# Patient Record
Sex: FEMALE | Race: WHITE | Employment: FULL TIME | URBAN - NONMETROPOLITAN AREA
[De-identification: names, ages, dates, MRNs, and addresses within clinical notes are randomized per-mention and may not be internally consistent; named-entity substitution may affect disease eponyms.]

---

## 2017-01-13 ENCOUNTER — TELEPHONE (OUTPATIENT)
Dept: CARDIOLOGY | Age: 47
End: 2017-01-13

## 2017-02-03 ENCOUNTER — OFFICE VISIT (OUTPATIENT)
Dept: CARDIOLOGY | Age: 47
End: 2017-02-03

## 2017-02-03 VITALS
HEART RATE: 92 BPM | BODY MASS INDEX: 27 KG/M2 | SYSTOLIC BLOOD PRESSURE: 150 MMHG | DIASTOLIC BLOOD PRESSURE: 88 MMHG | HEIGHT: 66 IN | WEIGHT: 168 LBS

## 2017-02-03 DIAGNOSIS — I34.0 NON-RHEUMATIC MITRAL REGURGITATION: ICD-10-CM

## 2017-02-03 DIAGNOSIS — I10 ESSENTIAL HYPERTENSION: ICD-10-CM

## 2017-02-03 DIAGNOSIS — R07.82 INTERCOSTAL PAIN: Primary | ICD-10-CM

## 2017-02-03 PROCEDURE — 99214 OFFICE O/P EST MOD 30 MIN: CPT | Performed by: NUCLEAR MEDICINE

## 2017-02-03 PROCEDURE — 93000 ELECTROCARDIOGRAM COMPLETE: CPT | Performed by: NUCLEAR MEDICINE

## 2017-02-03 RX ORDER — METOPROLOL TARTRATE 50 MG/1
50 TABLET, FILM COATED ORAL 2 TIMES DAILY
Qty: 60 TABLET | Refills: 3 | Status: SHIPPED | OUTPATIENT
Start: 2017-02-03 | End: 2017-05-05 | Stop reason: SDUPTHER

## 2017-02-08 ENCOUNTER — TELEPHONE (OUTPATIENT)
Dept: CARDIOLOGY | Age: 47
End: 2017-02-08

## 2017-03-07 ENCOUNTER — OFFICE VISIT (OUTPATIENT)
Age: 47
End: 2017-03-07

## 2017-03-07 ENCOUNTER — TELEPHONE (OUTPATIENT)
Age: 47
End: 2017-03-07

## 2017-03-07 VITALS
SYSTOLIC BLOOD PRESSURE: 128 MMHG | RESPIRATION RATE: 16 BRPM | DIASTOLIC BLOOD PRESSURE: 78 MMHG | HEIGHT: 66 IN | BODY MASS INDEX: 25.71 KG/M2 | WEIGHT: 160 LBS | HEART RATE: 80 BPM | TEMPERATURE: 96.9 F

## 2017-03-07 DIAGNOSIS — K81.1 CHRONIC CHOLECYSTITIS: Primary | ICD-10-CM

## 2017-03-07 DIAGNOSIS — K82.8 BILIARY DYSKINESIA: ICD-10-CM

## 2017-03-07 PROCEDURE — 99244 OFF/OP CNSLTJ NEW/EST MOD 40: CPT | Performed by: SURGERY

## 2017-03-07 RX ORDER — NICOTINE POLACRILEX 4 MG/1
1 GUM, CHEWING ORAL DAILY
COMMUNITY
Start: 2017-02-09 | End: 2017-05-05 | Stop reason: ALTCHOICE

## 2017-03-07 ASSESSMENT — ENCOUNTER SYMPTOMS
VOMITING: 0
COUGH: 0
NAUSEA: 1
EYE ITCHING: 0
PHOTOPHOBIA: 0
EYE PAIN: 0
RHINORRHEA: 0
RECTAL PAIN: 0
SINUS PRESSURE: 0
ABDOMINAL DISTENTION: 1
ABDOMINAL PAIN: 1
FACIAL SWELLING: 0
EYE DISCHARGE: 0
DIARRHEA: 1
SORE THROAT: 0
ANAL BLEEDING: 0
TROUBLE SWALLOWING: 0
BACK PAIN: 0
COLOR CHANGE: 0
BLOOD IN STOOL: 0
STRIDOR: 0
SHORTNESS OF BREATH: 1
CHOKING: 0
CHEST TIGHTNESS: 0
EYE REDNESS: 0
WHEEZING: 0
CONSTIPATION: 0
APNEA: 0
VOICE CHANGE: 0

## 2017-03-28 ENCOUNTER — TELEPHONE (OUTPATIENT)
Age: 47
End: 2017-03-28

## 2017-04-11 ENCOUNTER — OFFICE VISIT (OUTPATIENT)
Age: 47
End: 2017-04-11

## 2017-04-11 VITALS
TEMPERATURE: 96.8 F | BODY MASS INDEX: 26.47 KG/M2 | HEART RATE: 84 BPM | DIASTOLIC BLOOD PRESSURE: 78 MMHG | WEIGHT: 164 LBS | RESPIRATION RATE: 16 BRPM | SYSTOLIC BLOOD PRESSURE: 130 MMHG

## 2017-04-11 DIAGNOSIS — K82.8 BILIARY DYSKINESIA: ICD-10-CM

## 2017-04-11 DIAGNOSIS — K81.1 CHRONIC CHOLECYSTITIS: Primary | ICD-10-CM

## 2017-04-11 PROCEDURE — 99024 POSTOP FOLLOW-UP VISIT: CPT | Performed by: SURGERY

## 2017-05-05 ENCOUNTER — OFFICE VISIT (OUTPATIENT)
Dept: CARDIOLOGY | Age: 47
End: 2017-05-05

## 2017-05-05 VITALS
SYSTOLIC BLOOD PRESSURE: 128 MMHG | WEIGHT: 165 LBS | DIASTOLIC BLOOD PRESSURE: 80 MMHG | HEART RATE: 80 BPM | HEIGHT: 66 IN | BODY MASS INDEX: 26.52 KG/M2

## 2017-05-05 DIAGNOSIS — I34.0 NON-RHEUMATIC MITRAL REGURGITATION: ICD-10-CM

## 2017-05-05 DIAGNOSIS — E78.01 FAMILIAL HYPERCHOLESTEROLEMIA: ICD-10-CM

## 2017-05-05 DIAGNOSIS — I10 ESSENTIAL HYPERTENSION: Primary | ICD-10-CM

## 2017-05-05 PROCEDURE — 99213 OFFICE O/P EST LOW 20 MIN: CPT | Performed by: NUCLEAR MEDICINE

## 2017-05-05 RX ORDER — METOPROLOL TARTRATE 50 MG/1
50 TABLET, FILM COATED ORAL 2 TIMES DAILY
Qty: 60 TABLET | Refills: 12 | Status: SHIPPED | OUTPATIENT
Start: 2017-05-05 | End: 2018-05-01 | Stop reason: SDUPTHER

## 2017-10-26 RX ORDER — VALSARTAN 160 MG/1
160 TABLET ORAL DAILY
Qty: 30 TABLET | Refills: 5 | Status: SHIPPED | OUTPATIENT
Start: 2017-10-26 | End: 2018-04-01 | Stop reason: SDUPTHER

## 2018-04-02 RX ORDER — VALSARTAN 160 MG/1
TABLET ORAL
Qty: 30 TABLET | Refills: 5 | Status: SHIPPED | OUTPATIENT
Start: 2018-04-02 | End: 2018-05-11 | Stop reason: SDUPTHER

## 2018-05-01 RX ORDER — METOPROLOL TARTRATE 50 MG/1
50 TABLET, FILM COATED ORAL 2 TIMES DAILY
Qty: 60 TABLET | Refills: 12 | Status: SHIPPED | OUTPATIENT
Start: 2018-05-01 | End: 2018-05-11 | Stop reason: SDUPTHER

## 2018-05-11 ENCOUNTER — OFFICE VISIT (OUTPATIENT)
Dept: CARDIOLOGY CLINIC | Age: 48
End: 2018-05-11
Payer: COMMERCIAL

## 2018-05-11 VITALS
HEART RATE: 76 BPM | SYSTOLIC BLOOD PRESSURE: 112 MMHG | DIASTOLIC BLOOD PRESSURE: 62 MMHG | WEIGHT: 160 LBS | HEIGHT: 66 IN | BODY MASS INDEX: 25.71 KG/M2

## 2018-05-11 DIAGNOSIS — I34.0 NON-RHEUMATIC MITRAL REGURGITATION: ICD-10-CM

## 2018-05-11 DIAGNOSIS — R06.09 DYSPNEA ON EXERTION: ICD-10-CM

## 2018-05-11 DIAGNOSIS — R07.89 CHEST PRESSURE: Primary | ICD-10-CM

## 2018-05-11 PROCEDURE — 93000 ELECTROCARDIOGRAM COMPLETE: CPT | Performed by: NUCLEAR MEDICINE

## 2018-05-11 PROCEDURE — 99213 OFFICE O/P EST LOW 20 MIN: CPT | Performed by: NUCLEAR MEDICINE

## 2018-05-11 RX ORDER — METOPROLOL TARTRATE 50 MG/1
50 TABLET, FILM COATED ORAL 2 TIMES DAILY
Qty: 60 TABLET | Refills: 12 | Status: SHIPPED | OUTPATIENT
Start: 2018-05-11 | End: 2019-05-14 | Stop reason: SDUPTHER

## 2018-05-11 RX ORDER — VALSARTAN 160 MG/1
TABLET ORAL
Qty: 30 TABLET | Refills: 11 | Status: SHIPPED | OUTPATIENT
Start: 2018-05-11 | End: 2018-07-29 | Stop reason: SDUPTHER

## 2018-05-11 RX ORDER — FENOFIBRATE 145 MG/1
145 TABLET, COATED ORAL DAILY
Qty: 30 TABLET | Refills: 11 | Status: SHIPPED | OUTPATIENT
Start: 2018-05-11 | End: 2019-05-14 | Stop reason: SDUPTHER

## 2018-05-11 RX ORDER — OMEPRAZOLE 20 MG/1
20 CAPSULE, DELAYED RELEASE ORAL DAILY
COMMUNITY
End: 2018-12-03 | Stop reason: ALTCHOICE

## 2018-06-04 ENCOUNTER — HOSPITAL ENCOUNTER (OUTPATIENT)
Dept: NON INVASIVE DIAGNOSTICS | Age: 48
Discharge: HOME | End: 2018-06-04
Payer: COMMERCIAL

## 2018-06-04 DIAGNOSIS — R06.09 DYSPNEA ON EXERTION: ICD-10-CM

## 2018-06-04 DIAGNOSIS — I34.0 NON-RHEUMATIC MITRAL REGURGITATION: ICD-10-CM

## 2018-06-04 LAB
LV EF: 60 %
LVEF MODALITY: NORMAL

## 2018-06-04 PROCEDURE — 93306 TTE W/DOPPLER COMPLETE: CPT

## 2018-06-06 ENCOUNTER — TELEPHONE (OUTPATIENT)
Dept: CARDIOLOGY CLINIC | Age: 48
End: 2018-06-06

## 2018-07-30 RX ORDER — IRBESARTAN 150 MG/1
150 TABLET ORAL NIGHTLY
Qty: 30 TABLET | Refills: 5 | Status: SHIPPED | OUTPATIENT
Start: 2018-07-30 | End: 2018-12-03

## 2018-12-03 ENCOUNTER — OFFICE VISIT (OUTPATIENT)
Dept: CARDIOLOGY CLINIC | Age: 48
End: 2018-12-03
Payer: COMMERCIAL

## 2018-12-03 ENCOUNTER — TELEPHONE (OUTPATIENT)
Dept: CARDIOLOGY CLINIC | Age: 48
End: 2018-12-03

## 2018-12-03 VITALS
BODY MASS INDEX: 27 KG/M2 | DIASTOLIC BLOOD PRESSURE: 72 MMHG | WEIGHT: 168 LBS | HEART RATE: 72 BPM | HEIGHT: 66 IN | SYSTOLIC BLOOD PRESSURE: 116 MMHG

## 2018-12-03 DIAGNOSIS — E78.1 PURE HYPERGLYCERIDEMIA: ICD-10-CM

## 2018-12-03 DIAGNOSIS — I05.9 MITRAL VALVE DISEASE: Primary | ICD-10-CM

## 2018-12-03 DIAGNOSIS — I10 ESSENTIAL HYPERTENSION: ICD-10-CM

## 2018-12-03 LAB
A/G RATIO: NORMAL
ALBUMIN SERPL-MCNC: 4.8 G/DL
ALP BLD-CCNC: 26 U/L
ALT SERPL-CCNC: 22 U/L
AST SERPL-CCNC: 25 U/L
BILIRUB SERPL-MCNC: 0.4 MG/DL (ref 0.1–1.4)
BILIRUBIN DIRECT: 0.1 MG/DL
BILIRUBIN, INDIRECT: 0.3
CHOLESTEROL, TOTAL: 151 MG/DL
CHOLESTEROL/HDL RATIO: NORMAL
GLOBULIN: NORMAL
HDLC SERPL-MCNC: 45 MG/DL (ref 35–70)
LDL CHOLESTEROL CALCULATED: 80 MG/DL (ref 0–160)
PROTEIN TOTAL: 7.4 G/DL
TRIGL SERPL-MCNC: 130 MG/DL
VLDLC SERPL CALC-MCNC: 26 MG/DL

## 2018-12-03 PROCEDURE — 99213 OFFICE O/P EST LOW 20 MIN: CPT | Performed by: NUCLEAR MEDICINE

## 2018-12-03 RX ORDER — VALSARTAN 80 MG/1
80 TABLET ORAL DAILY
Qty: 30 TABLET | Refills: 6 | Status: SHIPPED | OUTPATIENT
Start: 2018-12-03 | End: 2019-07-09 | Stop reason: SDUPTHER

## 2018-12-03 NOTE — PROGRESS NOTES
tablet 11     No current facility-administered medications for this visit. No Known Allergies  Health Maintenance   Topic Date Due    HIV screen  04/11/1984    DTaP/Tdap/Td vaccine (1 - Tdap) 04/11/1988    Pneumococcal med risk (1 of 1 - PPSV23) 04/11/1988    Cervical cancer screen  04/11/1990    Lipid screen  04/11/2009    Creatinine monitoring  07/12/2017    Potassium monitoring  07/21/2017    Flu vaccine (1) 09/01/2018       Subjective:  Review of Systems  General:   No fever, no chills, No fatigue or weight loss  Pulmonary:    No dyspnea, no wheezing  Cardiac:    Denies recent chest pain,   GI:     No nausea or vomiting, no abdominal pain  Neuro:    No dizziness or light headedness,   Musculoskeletal:  No recent active issues  Extremities:   No edema, good peripheral pulses      Objective:  Physical Exam  /72   Pulse 72   Ht 5' 6\" (1.676 m)   Wt 168 lb (76.2 kg)   BMI 27.12 kg/m²   General:   Well developed, well nourished  Lungs:   Clear to auscultation  Heart:    Normal S1 S2, Slight murmur. no rubs, no gallops  Abdomen:   Soft, non tender, no organomegalies, positive bowel sounds  Extremities:   No edema, no cyanosis, good peripheral pulses  Neurological:   Awake, alert, oriented. No obvious focal deficits  Musculoskelatal:  No obvious deformities    Assessment:      Diagnosis Orders   1. Mitral valve disease     2. Essential hypertension     cardiac fair for now  Monitoring the [facility]     Plan:  No Follow-up on file. Lipids  Continue risk factor modification and medical management  Thank you for allowing me to participate in the care of your patient. Please don't hesitate to contact me regarding any further issues related to the patient care    Orders Placed:  No orders of the defined types were placed in this encounter. Medications Prescribed:  No orders of the defined types were placed in this encounter. Discussed use, benefit, and side effects of prescribed medications.

## 2019-05-14 RX ORDER — FENOFIBRATE 145 MG/1
TABLET, COATED ORAL
Qty: 30 TABLET | Refills: 11 | Status: SHIPPED | OUTPATIENT
Start: 2019-05-14 | End: 2019-12-02 | Stop reason: SDUPTHER

## 2019-05-14 RX ORDER — METOPROLOL TARTRATE 50 MG/1
TABLET, FILM COATED ORAL
Qty: 60 TABLET | Refills: 12 | Status: SHIPPED | OUTPATIENT
Start: 2019-05-14 | End: 2019-12-02 | Stop reason: SDUPTHER

## 2019-07-09 RX ORDER — VALSARTAN 80 MG/1
TABLET ORAL
Qty: 30 TABLET | Refills: 6 | Status: SHIPPED | OUTPATIENT
Start: 2019-07-09 | End: 2019-12-02 | Stop reason: SDUPTHER

## 2019-12-02 ENCOUNTER — OFFICE VISIT (OUTPATIENT)
Dept: CARDIOLOGY CLINIC | Age: 49
End: 2019-12-02
Payer: COMMERCIAL

## 2019-12-02 VITALS
SYSTOLIC BLOOD PRESSURE: 112 MMHG | DIASTOLIC BLOOD PRESSURE: 70 MMHG | HEART RATE: 76 BPM | HEIGHT: 66 IN | WEIGHT: 164 LBS | BODY MASS INDEX: 26.36 KG/M2

## 2019-12-02 DIAGNOSIS — I05.9 MITRAL VALVE DISEASE: Primary | ICD-10-CM

## 2019-12-02 DIAGNOSIS — I10 ESSENTIAL HYPERTENSION: ICD-10-CM

## 2019-12-02 DIAGNOSIS — E78.1 PURE HYPERGLYCERIDEMIA: ICD-10-CM

## 2019-12-02 PROCEDURE — 93000 ELECTROCARDIOGRAM COMPLETE: CPT | Performed by: NUCLEAR MEDICINE

## 2019-12-02 PROCEDURE — 99213 OFFICE O/P EST LOW 20 MIN: CPT | Performed by: NUCLEAR MEDICINE

## 2019-12-02 RX ORDER — VALSARTAN 80 MG/1
TABLET ORAL
Qty: 90 TABLET | Refills: 3 | Status: SHIPPED | OUTPATIENT
Start: 2019-12-02 | End: 2020-12-03 | Stop reason: SDUPTHER

## 2019-12-02 RX ORDER — FENOFIBRATE 145 MG/1
TABLET, COATED ORAL
Qty: 90 TABLET | Refills: 3 | Status: SHIPPED | OUTPATIENT
Start: 2019-12-02 | End: 2020-12-03 | Stop reason: SDUPTHER

## 2019-12-02 RX ORDER — METOPROLOL TARTRATE 50 MG/1
TABLET, FILM COATED ORAL
Qty: 180 TABLET | Refills: 3 | Status: SHIPPED | OUTPATIENT
Start: 2019-12-02 | End: 2020-05-14

## 2020-05-14 RX ORDER — METOPROLOL TARTRATE 50 MG/1
TABLET, FILM COATED ORAL
Qty: 60 TABLET | Refills: 7 | Status: SHIPPED | OUTPATIENT
Start: 2020-05-14 | End: 2020-12-03 | Stop reason: SDUPTHER

## 2020-11-30 ENCOUNTER — HOSPITAL ENCOUNTER (OUTPATIENT)
Age: 50
Setting detail: SPECIMEN
Discharge: HOME | End: 2020-11-30
Payer: COMMERCIAL

## 2020-11-30 PROCEDURE — U0003 INFECTIOUS AGENT DETECTION BY NUCLEIC ACID (DNA OR RNA); SEVERE ACUTE RESPIRATORY SYNDROME CORONAVIRUS 2 (SARS-COV-2) (CORONAVIRUS DISEASE [COVID-19]), AMPLIFIED PROBE TECHNIQUE, MAKING USE OF HIGH THROUGHPUT TECHNOLOGIES AS DESCRIBED BY CMS-2020-01-R: HCPCS

## 2020-12-03 LAB — SARS-COV-2: DETECTED

## 2020-12-03 RX ORDER — VALSARTAN 80 MG/1
TABLET ORAL
Qty: 90 TABLET | Refills: 3 | Status: SHIPPED | OUTPATIENT
Start: 2020-12-03 | End: 2021-03-10 | Stop reason: SDUPTHER

## 2020-12-03 RX ORDER — FENOFIBRATE 145 MG/1
TABLET, COATED ORAL
Qty: 90 TABLET | Refills: 3 | Status: SHIPPED | OUTPATIENT
Start: 2020-12-03 | End: 2021-03-10 | Stop reason: SDUPTHER

## 2020-12-03 RX ORDER — METOPROLOL TARTRATE 50 MG/1
TABLET, FILM COATED ORAL
Qty: 60 TABLET | Refills: 7 | Status: SHIPPED | OUTPATIENT
Start: 2020-12-03 | End: 2021-03-10 | Stop reason: ALTCHOICE

## 2020-12-03 NOTE — TELEPHONE ENCOUNTER
Teresa Hagan called requesting a refill on the following medications:  Requested Prescriptions     Pending Prescriptions Disp Refills    valsartan (DIOVAN) 80 MG tablet 90 tablet 3     Sig: take 1 tablet by mouth once daily    metoprolol tartrate (LOPRESSOR) 50 MG tablet 60 tablet 7    fenofibrate (TRICOR) 145 MG tablet 90 tablet 3     Sig: take 1 tablet by mouth once daily     Pharmacy verified: [facility] in [place]  ++++ pt had to cancel 12/7 visit because she tested positive for covid, getting low on meds, and she's on the wait list for a cancellation      Date of last visit: 12/2/19  Date of next visit (if applicable): 5/66/8878

## 2021-03-10 ENCOUNTER — TELEPHONE (OUTPATIENT)
Dept: CARDIOLOGY CLINIC | Age: 51
End: 2021-03-10

## 2021-03-10 ENCOUNTER — HOSPITAL ENCOUNTER (OUTPATIENT)
Age: 51
Discharge: HOME | End: 2021-03-10
Payer: COMMERCIAL

## 2021-03-10 ENCOUNTER — OFFICE VISIT (OUTPATIENT)
Dept: CARDIOLOGY CLINIC | Age: 51
End: 2021-03-10
Payer: COMMERCIAL

## 2021-03-10 VITALS
WEIGHT: 178 LBS | SYSTOLIC BLOOD PRESSURE: 158 MMHG | HEIGHT: 66 IN | BODY MASS INDEX: 28.61 KG/M2 | DIASTOLIC BLOOD PRESSURE: 80 MMHG | HEART RATE: 82 BPM

## 2021-03-10 DIAGNOSIS — F17.200 SMOKING: ICD-10-CM

## 2021-03-10 DIAGNOSIS — I10 ESSENTIAL HYPERTENSION: ICD-10-CM

## 2021-03-10 DIAGNOSIS — I05.9 MITRAL VALVE DISEASE: ICD-10-CM

## 2021-03-10 DIAGNOSIS — I05.9 MITRAL VALVE DISEASE: Primary | ICD-10-CM

## 2021-03-10 LAB
CHOLESTEROL, TOTAL: 204 MG/DL (ref 100–199)
HDLC SERPL-MCNC: 42 MG/DL
LDL CHOLESTEROL CALCULATED: 122 MG/DL
TRIGL SERPL-MCNC: 201 MG/DL (ref 0–199)

## 2021-03-10 PROCEDURE — 80061 LIPID PANEL: CPT

## 2021-03-10 PROCEDURE — 93000 ELECTROCARDIOGRAM COMPLETE: CPT | Performed by: NUCLEAR MEDICINE

## 2021-03-10 PROCEDURE — 99214 OFFICE O/P EST MOD 30 MIN: CPT | Performed by: NUCLEAR MEDICINE

## 2021-03-10 PROCEDURE — 36415 COLL VENOUS BLD VENIPUNCTURE: CPT

## 2021-03-10 RX ORDER — FENOFIBRATE 145 MG/1
TABLET, COATED ORAL
Qty: 90 TABLET | Refills: 3 | Status: SHIPPED | OUTPATIENT
Start: 2021-03-10

## 2021-03-10 RX ORDER — VALSARTAN 80 MG/1
TABLET ORAL
Qty: 90 TABLET | Refills: 3 | Status: SHIPPED | OUTPATIENT
Start: 2021-03-10 | End: 2022-04-07

## 2021-03-10 NOTE — TELEPHONE ENCOUNTER
Spoke with pt. Echo scheduled 3-22-21 at [address] [place] [address].  Pt voiced understanding.

## 2021-03-12 RX ORDER — METOPROLOL TARTRATE 50 MG/1
50 TABLET, FILM COATED ORAL 2 TIMES DAILY
Qty: 180 TABLET | Refills: 3 | Status: SHIPPED | OUTPATIENT
Start: 2021-03-12 | End: 2022-05-26

## 2021-03-12 NOTE — TELEPHONE ENCOUNTER
Pt called asking about the metoprolol tartrate dose was seen in office 3/10/21, changed to 25 mg bid. Pt was not told it was changing and wanted to double check the dose. pt was taking 50 mg bid. Dr. Adalgisa Sanchez advise?

## 2021-03-22 ENCOUNTER — HOSPITAL ENCOUNTER (OUTPATIENT)
Dept: NON INVASIVE DIAGNOSTICS | Age: 51
Discharge: HOME | End: 2021-03-22
Payer: COMMERCIAL

## 2021-03-22 DIAGNOSIS — I05.9 MITRAL VALVE DISEASE: ICD-10-CM

## 2021-03-22 DIAGNOSIS — I10 ESSENTIAL HYPERTENSION: ICD-10-CM

## 2021-03-22 DIAGNOSIS — F17.200 SMOKING: ICD-10-CM

## 2021-03-22 LAB
LV EF: 60 %
LVEF MODALITY: NORMAL

## 2021-03-22 PROCEDURE — 93306 TTE W/DOPPLER COMPLETE: CPT

## 2022-04-07 RX ORDER — VALSARTAN 80 MG/1
TABLET ORAL
Qty: 90 TABLET | Refills: 3 | Status: SHIPPED | OUTPATIENT
Start: 2022-04-07

## 2022-05-26 RX ORDER — METOPROLOL TARTRATE 50 MG/1
TABLET, FILM COATED ORAL
Qty: 180 TABLET | Refills: 3 | Status: SHIPPED | OUTPATIENT
Start: 2022-05-26

## 2022-08-02 ENCOUNTER — HOSPITAL ENCOUNTER (OUTPATIENT)
Dept: MRI IMAGING | Age: 52
Discharge: HOME | End: 2022-08-02
Payer: COMMERCIAL

## 2022-08-02 DIAGNOSIS — R42 DIZZINESS: ICD-10-CM

## 2022-08-02 DIAGNOSIS — R51.9 NONINTRACTABLE HEADACHE, UNSPECIFIED CHRONICITY PATTERN, UNSPECIFIED HEADACHE TYPE: ICD-10-CM

## 2022-08-02 PROCEDURE — A9579 GAD-BASE MR CONTRAST NOS,1ML: HCPCS | Performed by: NURSE PRACTITIONER

## 2022-08-02 PROCEDURE — 6360000004 HC RX CONTRAST MEDICATION: Performed by: NURSE PRACTITIONER

## 2022-08-02 PROCEDURE — 70553 MRI BRAIN STEM W/O & W/DYE: CPT

## 2022-08-02 RX ADMIN — GADOTERIDOL 18 ML: 279.3 INJECTION, SOLUTION INTRAVENOUS at 10:20

## 2022-12-07 ENCOUNTER — OFFICE VISIT (OUTPATIENT)
Dept: CARDIOLOGY CLINIC | Age: 52
End: 2022-12-07
Payer: COMMERCIAL

## 2022-12-07 VITALS
BODY MASS INDEX: 29.18 KG/M2 | DIASTOLIC BLOOD PRESSURE: 60 MMHG | HEIGHT: 66 IN | WEIGHT: 181.6 LBS | HEART RATE: 84 BPM | SYSTOLIC BLOOD PRESSURE: 128 MMHG

## 2022-12-07 DIAGNOSIS — I10 PRIMARY HYPERTENSION: ICD-10-CM

## 2022-12-07 DIAGNOSIS — I05.9 MITRAL VALVE DISEASE: Primary | ICD-10-CM

## 2022-12-07 PROCEDURE — 93000 ELECTROCARDIOGRAM COMPLETE: CPT | Performed by: NUCLEAR MEDICINE

## 2022-12-07 PROCEDURE — 3078F DIAST BP <80 MM HG: CPT | Performed by: NUCLEAR MEDICINE

## 2022-12-07 PROCEDURE — 99213 OFFICE O/P EST LOW 20 MIN: CPT | Performed by: NUCLEAR MEDICINE

## 2022-12-07 PROCEDURE — 3074F SYST BP LT 130 MM HG: CPT | Performed by: NUCLEAR MEDICINE

## 2022-12-07 RX ORDER — VALSARTAN 80 MG/1
TABLET ORAL
Qty: 90 TABLET | Refills: 3 | Status: SHIPPED | OUTPATIENT
Start: 2022-12-07

## 2022-12-07 RX ORDER — SULFAMETHOXAZOLE AND TRIMETHOPRIM 800; 160 MG/1; MG/1
1 TABLET ORAL 2 TIMES DAILY
COMMUNITY

## 2022-12-07 RX ORDER — FAMOTIDINE 20 MG/1
20 TABLET, FILM COATED ORAL DAILY
COMMUNITY

## 2022-12-07 RX ORDER — METOPROLOL TARTRATE 50 MG/1
TABLET, FILM COATED ORAL
Qty: 180 TABLET | Refills: 3 | Status: SHIPPED | OUTPATIENT
Start: 2022-12-07

## 2022-12-07 RX ORDER — FENOFIBRATE 145 MG/1
TABLET, COATED ORAL
Qty: 90 TABLET | Refills: 3 | Status: SHIPPED | OUTPATIENT
Start: 2022-12-07

## 2022-12-07 NOTE — PROGRESS NOTES
1 year follow-up. She denies having any cardiac concerns. She states her PCP started her on Farxiga 5mg daily for cholesterol and she is questioning if she needs it. EKG completed.

## 2022-12-07 NOTE — PROGRESS NOTES
[address] [facility] [employer] .  [address] [place] [address]  Dept: [phone]  Dept Fax: [phone]  Loc: [phone]    Visit Date: 12/7/2022    Patito Deng is a 47 y.o. female who presents todayfor:  Chief Complaint   Patient presents with    1 Year Follow Up    Valvular Heart Disease     mitral    Hypertension    Hyperlipidemia   Working on her smoking   Know MR and VANDANA   No chest pain   No changes in breathing  Baseline dyspnea  BP is stable   No dizziness  No syncope        HPI:  HPI  Past Medical History:   Diagnosis Date    Hypertension     PONV (postoperative nausea and vomiting)       Past Surgical History:   Procedure Laterality Date    CARPAL TUNNEL RELEASE Bilateral over 10 years ago    CHOLECYSTECTOMY  03/27/2017    LAPAROSCOPIC--Dr. Suzanna Geronimo    COLONOSCOPY  2006    [place]iko-? dr Delacruz   07/21/2016    excisional hemorrhiodectomy--Dr. Suzanna Geronimo    HYSTERECTOMY (CERVIX STATUS UNKNOWN)  over 10 years    partial    NECK SURGERY  1997-98    x 2, cervical fusion by Dr. Demetrius De Los Santos  2006    Miko?      Family History   Problem Relation Age of Onset    Cancer Maternal Grandmother         ? kind    Heart Disease Maternal Grandfather     High Blood Pressure Paternal Grandmother     High Blood Pressure Paternal Grandfather     Stroke Paternal Cousin     Diabetes Neg Hx      Social History     Tobacco Use    Smoking status: Some Days     Packs/day: 0.50     Types: Cigarettes    Smokeless tobacco: Never    Tobacco comments:     3/17/17 weekends   Substance Use Topics    Alcohol use: Yes     Comment: social      Current Outpatient Medications   Medication Sig Dispense Refill    dapagliflozin (FARXIGA) 5 MG tablet Take 5 mg by mouth every morning      sulfamethoxazole-trimethoprim (BACTRIM DS;SEPTRA DS) 800-160 MG per tablet Take 1 tablet by mouth 2 times daily      famotidine (PEPCID) 20 MG tablet Take 20 mg by mouth daily      metoprolol tartrate (LOPRESSOR) 50 MG tablet take 1 tablet by mouth twice a day 180 tablet 3    valsartan (DIOVAN) 80 MG tablet take 1 tablet by mouth once daily 90 tablet 3    fenofibrate (TRICOR) 145 MG tablet take 1 tablet by mouth once daily 90 tablet 3     No current facility-administered medications for this visit. No Known Allergies  Health Maintenance   Topic Date Due    Depression Screen  Never done    HIV screen  Never done    Hepatitis C screen  Never done    DTaP/Tdap/Td vaccine (1 - Tdap) Never done    Diabetes screen  Never done    Colorectal Cancer Screen  Never done    Breast cancer screen  Never done    Shingles vaccine (1 of 2) Never done    COVID-19 Vaccine (3 - Booster for Pfizer series) 06/18/2021    Lipids  03/10/2026    Flu vaccine  Completed    Pneumococcal 0-64 years Vaccine  Completed    Hepatitis A vaccine  Aged Out    Hib vaccine  Aged Out    Meningococcal (ACWY) vaccine  Aged Out       Subjective:  Review of Systems  General:   No fever, no chills, some fatigue or weight loss  Pulmonary:    some dyspnea, no wheezing  Cardiac:    Denies recent chest pain,   GI:     No nausea or vomiting, no abdominal pain  Neuro:    No dizziness or light headedness,   Musculoskeletal:  No recent active issues  Extremities:   No edema, no obvious claudication     Objective:  Physical Exam  /60   Pulse 84   Ht 5' 6\" (1.676 m)   Wt 181 lb 9.6 oz (82.4 kg)   BMI 29.31 kg/m²   General:   Well developed, well nourished  Lungs:   Clear to auscultation  Heart:    Normal S1 S2, Slight murmur. no rubs, no gallops  Abdomen:   Soft, non tender, no organomegalies, positive bowel sounds  Extremities:   No edema, no cyanosis, good peripheral pulses  Neurological:   Awake, alert, oriented. No obvious focal deficits  Musculoskelatal:  No obvious deformities    Assessment:      Diagnosis Orders   1.  Mitral valve disease  EKG 12 Lead 2. Primary hypertension        As above  Cardiac fair for now   ECG in office was done today. I reviewed the ECG. No acute findings    Plan:  No follow-ups on file. As above  Continue risk factor modification and medical management  Thank you for allowing me to participate in the care of your patient. Please don't hesitate to contact me regarding any further issues related to the patient care      Orders Placed:  Orders Placed This Encounter   Procedures    EKG 12 Lead     Order Specific Question:   Reason for Exam?     Answer: Other       Medications Prescribed:  No orders of the defined types were placed in this encounter. Discussed use, benefit, and side effects of prescribed medications. All patient questions answered. Pt voicedunderstanding. Instructed to continue current medications, diet and exercise. Continue risk factor modification and medical management. Patient agreed with treatment plan. Follow up as directed.     Electronically signedby Faisal Vasquez MD on 12/7/2022 at 7:33 AM

## 2023-03-27 NOTE — PROGRESS NOTES
[address] [address]  [address]  [facility] [address]  Dept: [phone]  Dept Fax: [phone]  Loc: [phone]    Visit Date: 3/10/2021    Kaleb Hamilton is a 45 y.o. female who presents todayfor:  Chief Complaint   Patient presents with    Cardiac Valve Problem    Hyperlipidemia    Nicotine Dependence     Had COVID last year   No chest pain  No changes in breathing  Some residual dyspnea  Worse after COVID breathing is still not normal   No dizziness  No syncope  Known VANDANA and some MR  Stopped smoking lately   Bp is stable  Known hyperlipidemia  Mainly TG   Possible COPD   Last echo 2018   HPI:  HPI  Past Medical History:   Diagnosis Date    Hypertension     PONV (postoperative nausea and vomiting)       Past Surgical History:   Procedure Laterality Date    CARPAL TUNNEL RELEASE Bilateral over 10 years ago    CHOLECYSTECTOMY  03/27/2017    LAPAROSCOPIC--Dr. Lucien Alberto    COLONOSCOPY  2006    [place]-? dr Escobar Urban  07/21/2016    excisional hemorrhiodectomy--Dr. Lucien Alberto    HYSTERECTOMY  over 10 years    partial    NECK SURGERY  1997-98    x 2, cervical fusion by Dr. Arun Vieira  2006    [place]? dr     Family History   Problem Relation Age of Onset    Cancer Maternal Grandmother         ? kind    Heart Disease Maternal Grandfather     High Blood Pressure Paternal Grandmother     High Blood Pressure Paternal Grandfather     Stroke Paternal Cousin     Diabetes Neg Hx      Social History     Tobacco Use    Smoking status: Current Some Day Smoker     Packs/day: 0.50     Types: Cigarettes    Smokeless tobacco: Never Used    Tobacco comment: 3/17/17 weekends   Substance Use Topics    Alcohol use: Yes     Comment: social      Current Outpatient Medications   Medication Sig Dispense Refill    metoprolol tartrate (LOPRESSOR) 25 MG tablet Take 25 mg by mouth 2 times daily      valsartan (DIOVAN) 80 MG tablet take 1 tablet by mouth once daily 90 tablet 3    fenofibrate (TRICOR) 145 MG tablet take 1 tablet by mouth once daily 90 tablet 3     No current facility-administered medications for this visit. No Known Allergies  Health Maintenance   Topic Date Due    Hepatitis C screen  Never done    Pneumococcal 0-64 years Vaccine (1 of 1 - PPSV23) Never done    HIV screen  Never done    DTaP/Tdap/Td vaccine (1 - Tdap) Never done    Cervical cancer screen  Never done    Diabetes screen  Never done    Creatinine monitoring  07/12/2017    Potassium monitoring  07/21/2017    Breast cancer screen  Never done    Shingles Vaccine (1 of 2) Never done    Colon cancer screen colonoscopy  Never done    Lipid screen  12/03/2023    Flu vaccine  Completed    Hepatitis A vaccine  Aged Out    Hepatitis B vaccine  Aged Out    Hib vaccine  Aged Out    Meningococcal (ACWY) vaccine  Aged Out       Subjective:  Review of Systems  General:   No fever, no chills, some fatigue or weight loss  Pulmonary:    some more dyspnea, no wheezing  Cardiac:    Denies recent chest pain,   GI:     No nausea or vomiting, no abdominal pain  Neuro:    No dizziness or light headedness,   Musculoskeletal:  No recent active issues  Extremities:   No edema, no obvious claudication       Objective:  Physical Exam  BP (!) 158/80   Pulse 82   Ht 5' 6\" (1.676 m)   Wt 178 lb (80.7 kg)   BMI 28.73 kg/m²   General:   Well developed, well nourished  Lungs:   Clear to auscultation  Heart:    Normal S1 S2, Slight murmur. no rubs, no gallops  Abdomen:   Soft, non tender, no organomegalies, positive bowel sounds  Extremities:   No edema, no cyanosis, good peripheral pulses  Neurological:   Awake, alert, oriented. No obvious focal deficits  Musculoskelatal:  No obvious deformities    Assessment:      Diagnosis Orders   1. Mitral valve disease  EKG 12 Lead   2. Essential hypertension     3.  Smoking     Smoking: discussed with the patient the importance of smoke cessation especially with the risk of CAD. As above post COVId symptoms  Some residual dyspnea  Know  and VANDANA   ECG in office was done today. I reviewed the ECG. No acute findings      Plan:  No follow-ups on file. Discussed  Echo   Monitor symptoms  Smoking: discussed with the patient the importance of smoke cessation especially with the risk of CAD. Continue risk factor modification and medical management  Thank you for allowing me to participate in the care of your patient. Please don't hesitate to contact me regarding any further issues related to the patient care    Orders Placed:  Orders Placed This Encounter   Procedures    EKG 12 Lead     Order Specific Question:   Reason for Exam?     Answer: Other       Medications Prescribed:  No orders of the defined types were placed in this encounter. Discussed use, benefit, and side effects of prescribed medications. All patient questions answered. Pt voicedunderstanding. Instructed to continue current medications, diet and exercise. Continue risk factor modification and medical management. Patient agreed with treatment plan. Follow up as directed.     Electronically signedby Edson Teran MD on 3/10/2021 at 7:51 AM Detail Level: Generalized Quality 130: Documentation Of Current Medications In The Medical Record: Current Medications Documented Quality 110: Preventive Care And Screening: Influenza Immunization: Influenza Immunization Administered during Influenza season

## 2023-05-05 ENCOUNTER — HOSPITAL ENCOUNTER (OUTPATIENT)
Dept: CT IMAGING | Age: 53
Discharge: HOME | End: 2023-05-05
Payer: COMMERCIAL

## 2023-05-05 DIAGNOSIS — R14.0 ABDOMINAL DISTENTION: ICD-10-CM

## 2023-05-05 PROCEDURE — 6360000004 HC RX CONTRAST MEDICATION: Performed by: STUDENT IN AN ORGANIZED HEALTH CARE EDUCATION/TRAINING PROGRAM

## 2023-05-05 PROCEDURE — 74178 CT ABD&PLV WO CNTR FLWD CNTR: CPT

## 2023-05-05 RX ADMIN — IOPAMIDOL 100 ML: 755 INJECTION, SOLUTION INTRAVENOUS at 16:15

## 2023-05-05 RX ADMIN — IOHEXOL 50 ML: 240 INJECTION, SOLUTION INTRATHECAL; INTRAVASCULAR; INTRAVENOUS; ORAL at 15:05

## 2023-09-01 ENCOUNTER — OFFICE VISIT (OUTPATIENT)
Dept: SURGERY | Age: 53
End: 2023-09-01

## 2023-09-01 VITALS
HEART RATE: 68 BPM | BODY MASS INDEX: 30.05 KG/M2 | SYSTOLIC BLOOD PRESSURE: 140 MMHG | WEIGHT: 187 LBS | TEMPERATURE: 97.4 F | RESPIRATION RATE: 18 BRPM | DIASTOLIC BLOOD PRESSURE: 82 MMHG | HEIGHT: 66 IN | OXYGEN SATURATION: 97 %

## 2023-09-01 DIAGNOSIS — K61.1 PERIRECTAL ABSCESS: Primary | ICD-10-CM

## 2023-09-01 RX ORDER — SULFAMETHOXAZOLE AND TRIMETHOPRIM 800; 160 MG/1; MG/1
1 TABLET ORAL 2 TIMES DAILY
Qty: 20 TABLET | Refills: 0 | Status: SHIPPED | OUTPATIENT
Start: 2023-09-01 | End: 2023-09-11

## 2023-09-01 NOTE — PROGRESS NOTES
[facility]ale [facility]. [place], [address]  [facility] [address]  [phone]  New Patient Evaluation in Office    Pt Name: Lima Person  Date of Birth 1970   Today's Date: 9/1/2023  Medical Record Number: [id number]  Referring Provider: No ref. provider found  Primary Care Provider: GAVIOTA Villanueva CNP  Chief Complaint   Patient presents with    Follow-up     Est pt, Rectal Bleeding post fall on 7/15/23. No ER visit, No PCP visit-no testing done        ASSESSMENT       Diagnosis Orders   1. Perirectal abscess             PLANS      Rx Bactrim  F/u 1 week     SUBJECTIVE      Yue Arrington is a 46 y.o. female in the office for evaluation of a rectal abscess. It began a few months ago and has gradually worsened. Symptoms include discharge, redness, swelling, and pain. Abscess has associated symptoms of spontaneous drainage. She does not have previous history of cutaneous abscesses. She does not have diabetes, immunosuppression or history of MRSA. Therapy thus far has included topicals which has not been effective.    Past Medical History  Past Medical History:   Diagnosis Date    Hypertension     PONV (postoperative nausea and vomiting)      Past Surgical History  Past Surgical History:   Procedure Laterality Date    CARPAL TUNNEL RELEASE Bilateral over 10 years ago    CHOLECYSTECTOMY  03/27/2017    LAPAROSCOPIC--Dr. Netty Romberg    COLONOSCOPY  2006    [place]-? dr Fabio Mcleod  07/21/2016    excisional hemorrhiodectomy--Dr. Netty Romberg    HYSTERECTOMY (CERVIX STATUS UNKNOWN)  over 10 years    partial    NECK SURGERY  1997-98    x 2, cervical fusion by Dr. Yarelis Asif  2006    Miko?      Medications  Current Outpatient Medications   Medication Sig Dispense Refill    sulfamethoxazole-trimethoprim (BACTRIM DS) 800-160 MG per tablet Take 1 tablet by mouth 2 times daily for

## 2023-09-08 ENCOUNTER — TELEPHONE (OUTPATIENT)
Dept: SURGERY | Age: 53
End: 2023-09-08

## 2023-09-08 ENCOUNTER — OFFICE VISIT (OUTPATIENT)
Dept: SURGERY | Age: 53
End: 2023-09-08
Payer: COMMERCIAL

## 2023-09-08 VITALS
DIASTOLIC BLOOD PRESSURE: 80 MMHG | HEART RATE: 63 BPM | HEIGHT: 66 IN | OXYGEN SATURATION: 98 % | BODY MASS INDEX: 29.59 KG/M2 | TEMPERATURE: 97 F | SYSTOLIC BLOOD PRESSURE: 138 MMHG | WEIGHT: 184.1 LBS | RESPIRATION RATE: 18 BRPM

## 2023-09-08 DIAGNOSIS — I10 ESSENTIAL HYPERTENSION: ICD-10-CM

## 2023-09-08 DIAGNOSIS — Z01.818 PRE-OP TESTING: ICD-10-CM

## 2023-09-08 DIAGNOSIS — K61.1 PERIRECTAL ABSCESS: Primary | ICD-10-CM

## 2023-09-08 PROCEDURE — 3075F SYST BP GE 130 - 139MM HG: CPT | Performed by: SURGERY

## 2023-09-08 PROCEDURE — 99213 OFFICE O/P EST LOW 20 MIN: CPT | Performed by: SURGERY

## 2023-09-08 PROCEDURE — 3079F DIAST BP 80-89 MM HG: CPT | Performed by: SURGERY

## 2023-09-08 NOTE — TELEPHONE ENCOUNTER
Pt of Dr. Ernestina Larsen last seen 12/7/22 is scheduled for anal fistulotomy with Dr. Anna Louise on 9/27/23. Can she be cleared for surgery?

## 2023-09-09 ENCOUNTER — HOSPITAL ENCOUNTER (OUTPATIENT)
Age: 53
Discharge: HOME | End: 2023-09-09
Payer: COMMERCIAL

## 2023-09-09 DIAGNOSIS — K61.1 PERIRECTAL ABSCESS: ICD-10-CM

## 2023-09-09 DIAGNOSIS — Z01.818 PRE-OP TESTING: ICD-10-CM

## 2023-09-09 DIAGNOSIS — I10 ESSENTIAL HYPERTENSION: ICD-10-CM

## 2023-09-09 LAB
ANION GAP SERPL CALC-SCNC: 11 MEQ/L (ref 8–16)
BUN SERPL-MCNC: 14 MG/DL (ref 7–22)
CALCIUM SERPL-MCNC: 9.6 MG/DL (ref 8.5–10.5)
CHLORIDE SERPL-SCNC: 101 MEQ/L (ref 98–111)
CO2 SERPL-SCNC: 26 MEQ/L (ref 23–33)
CREAT SERPL-MCNC: 1 MG/DL (ref 0.4–1.2)
GFR SERPL CREATININE-BSD FRML MDRD: > 60 ML/MIN/1.73M2
GLUCOSE SERPL-MCNC: 128 MG/DL (ref 70–108)
HCT VFR BLD AUTO: 41.9 % (ref 37–47)
HGB BLD-MCNC: 13.5 GM/DL (ref 12–16)
POTASSIUM SERPL-SCNC: 5.2 MEQ/L (ref 3.5–5.2)
SODIUM SERPL-SCNC: 138 MEQ/L (ref 135–145)

## 2023-09-09 PROCEDURE — 85014 HEMATOCRIT: CPT

## 2023-09-09 PROCEDURE — 85018 HEMOGLOBIN: CPT

## 2023-09-09 PROCEDURE — 80048 BASIC METABOLIC PNL TOTAL CA: CPT

## 2023-09-09 PROCEDURE — 36415 COLL VENOUS BLD VENIPUNCTURE: CPT

## 2023-09-14 ENCOUNTER — TELEPHONE (OUTPATIENT)
Dept: SURGERY | Age: 53
End: 2023-09-14

## 2023-09-14 NOTE — TELEPHONE ENCOUNTER
Called [facility] - CPT code 59841 does not require prior auth.   Call ref # [id number]

## 2023-09-15 ENCOUNTER — OFFICE VISIT (OUTPATIENT)
Dept: CARDIOLOGY CLINIC | Age: 53
End: 2023-09-15
Payer: COMMERCIAL

## 2023-09-15 VITALS
WEIGHT: 185.6 LBS | HEIGHT: 66 IN | BODY MASS INDEX: 29.83 KG/M2 | SYSTOLIC BLOOD PRESSURE: 153 MMHG | DIASTOLIC BLOOD PRESSURE: 88 MMHG | HEART RATE: 70 BPM

## 2023-09-15 DIAGNOSIS — I05.9 MITRAL VALVE DISEASE: Primary | ICD-10-CM

## 2023-09-15 DIAGNOSIS — I10 PRIMARY HYPERTENSION: ICD-10-CM

## 2023-09-15 PROCEDURE — 99213 OFFICE O/P EST LOW 20 MIN: CPT | Performed by: NUCLEAR MEDICINE

## 2023-09-15 PROCEDURE — 3079F DIAST BP 80-89 MM HG: CPT | Performed by: NUCLEAR MEDICINE

## 2023-09-15 PROCEDURE — 93000 ELECTROCARDIOGRAM COMPLETE: CPT | Performed by: NUCLEAR MEDICINE

## 2023-09-15 PROCEDURE — 3077F SYST BP >= 140 MM HG: CPT | Performed by: NUCLEAR MEDICINE

## 2023-09-15 NOTE — PROGRESS NOTES
[address] [address]  Dept: [phone]  Dept Fax: [phone]  Loc: [phone]    Visit Date: 9/15/2023    Peter Cobb is a 46 y.o. female who presents todayfor:  Chief Complaint   Patient presents with    Follow-up    Cardiac Clearance    Hypertension     Known LVH and VANDANA  Some baseline dyspnea  Exertional   No chest pain   BP is stable  No dizziness  No syncope        HPI:  HPI  Past Medical History:   Diagnosis Date    Hypertension     PONV (postoperative nausea and vomiting)       Past Surgical History:   Procedure Laterality Date    CARPAL TUNNEL RELEASE Bilateral over 10 years ago    CHOLECYSTECTOMY  03/27/2017    LAPAROSCOPIC--Dr. Karri Mesa    COLONOSCOPY  2006    [place]-? dr Magdalena Martinez  07/21/2016    excisional hemorrhiodectomy--Dr. Karri Mesa    HYSTERECTOMY (CERVIX STATUS UNKNOWN)  over 10 years    partial    NECK SURGERY  1997-98    x 2, cervical fusion by Dr. Birdie Dhillon  2006    [place]? dr     Family History   Problem Relation Age of Onset    Cancer Maternal Grandmother         ? kind    Heart Disease Maternal Grandfather     High Blood Pressure Paternal Grandmother     High Blood Pressure Paternal Grandfather     Stroke Paternal Cousin     Diabetes Neg Hx      Social History     Tobacco Use    Smoking status: Some Days     Packs/day: .5     Types: Cigarettes    Smokeless tobacco: Never    Tobacco comments:     3/17/17 weekends   Substance Use Topics    Alcohol use: Yes     Comment: social      Current Outpatient Medications   Medication Sig Dispense Refill    valsartan (DIOVAN) 160 MG tablet Take 1 tablet by mouth daily      famotidine (PEPCID) 20 MG tablet Take 1 tablet by mouth daily      fenofibrate (TRICOR) 145 MG tablet take 1 tablet by mouth once daily 90 tablet 3    metoprolol tartrate (LOPRESSOR) 50 MG

## 2023-09-19 ENCOUNTER — PREP FOR PROCEDURE (OUTPATIENT)
Dept: SURGERY | Age: 53
End: 2023-09-19

## 2023-09-19 RX ORDER — SODIUM CHLORIDE 0.9 % (FLUSH) 0.9 %
5-40 SYRINGE (ML) INJECTION PRN
Status: CANCELLED | OUTPATIENT
Start: 2023-09-19

## 2023-09-19 RX ORDER — ENEMA 19; 7 G/133ML; G/133ML
1 ENEMA RECTAL
Status: CANCELLED | OUTPATIENT
Start: 2023-09-19 | End: 2023-09-20

## 2023-09-19 RX ORDER — VALSARTAN 160 MG/1
160 TABLET ORAL DAILY
Qty: 30 TABLET | OUTPATIENT
Start: 2023-09-19

## 2023-09-19 RX ORDER — SODIUM CHLORIDE 0.9 % (FLUSH) 0.9 %
5-40 SYRINGE (ML) INJECTION EVERY 12 HOURS SCHEDULED
Status: CANCELLED | OUTPATIENT
Start: 2023-09-19

## 2023-09-19 RX ORDER — SODIUM CHLORIDE 9 MG/ML
INJECTION, SOLUTION INTRAVENOUS PRN
Status: CANCELLED | OUTPATIENT
Start: 2023-09-19

## 2023-09-19 NOTE — TELEPHONE ENCOUNTER
Cara Espinoza called requesting a refill on the following medications:  Requested Prescriptions     Pending Prescriptions Disp Refills    valsartan (DIOVAN) 160 MG tablet 30 tablet      Sig: Take 1 tablet by mouth daily     [facility]   . pv      Date of last visit:   Date of next visit (if applicable): Visit date not found

## 2023-09-20 ENCOUNTER — HOSPITAL ENCOUNTER (OUTPATIENT)
Dept: NON INVASIVE DIAGNOSTICS | Age: 53
Discharge: HOME | End: 2023-09-20
Attending: NUCLEAR MEDICINE
Payer: COMMERCIAL

## 2023-09-20 DIAGNOSIS — I05.9 MITRAL VALVE DISEASE: ICD-10-CM

## 2023-09-20 DIAGNOSIS — I10 PRIMARY HYPERTENSION: ICD-10-CM

## 2023-09-20 PROCEDURE — 93306 TTE W/DOPPLER COMPLETE: CPT

## 2023-09-20 RX ORDER — VALSARTAN 160 MG/1
160 TABLET ORAL DAILY
Qty: 90 TABLET | Refills: 3 | Status: SHIPPED | OUTPATIENT
Start: 2023-09-20

## 2023-09-21 ENCOUNTER — TELEPHONE (OUTPATIENT)
Dept: CARDIOLOGY CLINIC | Age: 53
End: 2023-09-21

## 2023-09-21 NOTE — TELEPHONE ENCOUNTER
Echo completed   Pt needs clearance for anal fistulotomy   Please advise   Form in Dr Greg Boyer box

## 2023-09-27 ENCOUNTER — ANESTHESIA (OUTPATIENT)
Dept: OPERATING ROOM | Age: 53
End: 2023-09-27
Payer: COMMERCIAL

## 2023-09-27 ENCOUNTER — HOSPITAL ENCOUNTER (OUTPATIENT)
Age: 53
Setting detail: OUTPATIENT SURGERY
Discharge: HOME | End: 2023-09-27
Attending: SURGERY | Admitting: SURGERY
Payer: COMMERCIAL

## 2023-09-27 ENCOUNTER — ANESTHESIA EVENT (OUTPATIENT)
Dept: OPERATING ROOM | Age: 53
End: 2023-09-27
Payer: COMMERCIAL

## 2023-09-27 VITALS
BODY MASS INDEX: 30.53 KG/M2 | SYSTOLIC BLOOD PRESSURE: 143 MMHG | RESPIRATION RATE: 20 BRPM | DIASTOLIC BLOOD PRESSURE: 81 MMHG | WEIGHT: 190 LBS | OXYGEN SATURATION: 96 % | TEMPERATURE: 97 F | HEART RATE: 75 BPM | HEIGHT: 66 IN

## 2023-09-27 DIAGNOSIS — G89.18 ACUTE POSTOPERATIVE PAIN: Primary | ICD-10-CM

## 2023-09-27 PROCEDURE — 2580000003 HC RX 258: Performed by: SURGERY

## 2023-09-27 PROCEDURE — 7100000011 HC PHASE II RECOVERY - ADDTL 15 MIN: Performed by: SURGERY

## 2023-09-27 PROCEDURE — 3600000002 HC SURGERY LEVEL 2 BASE: Performed by: SURGERY

## 2023-09-27 PROCEDURE — 3700000001 HC ADD 15 MINUTES (ANESTHESIA): Performed by: SURGERY

## 2023-09-27 PROCEDURE — 6360000002 HC RX W HCPCS: Performed by: SURGERY

## 2023-09-27 PROCEDURE — 3700000000 HC ANESTHESIA ATTENDED CARE: Performed by: SURGERY

## 2023-09-27 PROCEDURE — 7100000001 HC PACU RECOVERY - ADDTL 15 MIN: Performed by: SURGERY

## 2023-09-27 PROCEDURE — 7100000000 HC PACU RECOVERY - FIRST 15 MIN: Performed by: SURGERY

## 2023-09-27 PROCEDURE — 6370000000 HC RX 637 (ALT 250 FOR IP): Performed by: SURGERY

## 2023-09-27 PROCEDURE — 7100000010 HC PHASE II RECOVERY - FIRST 15 MIN: Performed by: SURGERY

## 2023-09-27 PROCEDURE — 3600000012 HC SURGERY LEVEL 2 ADDTL 15MIN: Performed by: SURGERY

## 2023-09-27 PROCEDURE — 2500000003 HC RX 250 WO HCPCS: Performed by: NURSE ANESTHETIST, CERTIFIED REGISTERED

## 2023-09-27 PROCEDURE — 6370000000 HC RX 637 (ALT 250 FOR IP): Performed by: ANESTHESIOLOGY

## 2023-09-27 PROCEDURE — 6360000002 HC RX W HCPCS: Performed by: NURSE ANESTHETIST, CERTIFIED REGISTERED

## 2023-09-27 PROCEDURE — 2709999900 HC NON-CHARGEABLE SUPPLY: Performed by: SURGERY

## 2023-09-27 RX ORDER — SODIUM CHLORIDE 0.9 % (FLUSH) 0.9 %
5-40 SYRINGE (ML) INJECTION EVERY 12 HOURS SCHEDULED
Status: DISCONTINUED | OUTPATIENT
Start: 2023-09-27 | End: 2023-09-27 | Stop reason: HOSPADM

## 2023-09-27 RX ORDER — SCOLOPAMINE TRANSDERMAL SYSTEM 1 MG/1
PATCH, EXTENDED RELEASE TRANSDERMAL
Status: COMPLETED
Start: 2023-09-27 | End: 2023-09-27

## 2023-09-27 RX ORDER — ROCURONIUM BROMIDE 10 MG/ML
INJECTION, SOLUTION INTRAVENOUS PRN
Status: DISCONTINUED | OUTPATIENT
Start: 2023-09-27 | End: 2023-09-27 | Stop reason: SDUPTHER

## 2023-09-27 RX ORDER — HYDROCODONE BITARTRATE AND ACETAMINOPHEN 5; 325 MG/1; MG/1
1 TABLET ORAL EVERY 4 HOURS PRN
Qty: 30 TABLET | Refills: 0 | Status: SHIPPED | OUTPATIENT
Start: 2023-09-27 | End: 2023-10-02

## 2023-09-27 RX ORDER — MIDAZOLAM HYDROCHLORIDE 1 MG/ML
INJECTION INTRAMUSCULAR; INTRAVENOUS PRN
Status: DISCONTINUED | OUTPATIENT
Start: 2023-09-27 | End: 2023-09-27 | Stop reason: SDUPTHER

## 2023-09-27 RX ORDER — BUPIVACAINE HYDROCHLORIDE 5 MG/ML
INJECTION, SOLUTION EPIDURAL; INTRACAUDAL PRN
Status: DISCONTINUED | OUTPATIENT
Start: 2023-09-27 | End: 2023-09-27 | Stop reason: ALTCHOICE

## 2023-09-27 RX ORDER — FENTANYL CITRATE 50 UG/ML
25 INJECTION, SOLUTION INTRAMUSCULAR; INTRAVENOUS EVERY 5 MIN PRN
Status: DISCONTINUED | OUTPATIENT
Start: 2023-09-27 | End: 2023-09-27 | Stop reason: HOSPADM

## 2023-09-27 RX ORDER — FENTANYL CITRATE 50 UG/ML
50 INJECTION, SOLUTION INTRAMUSCULAR; INTRAVENOUS EVERY 5 MIN PRN
Status: DISCONTINUED | OUTPATIENT
Start: 2023-09-27 | End: 2023-09-27 | Stop reason: HOSPADM

## 2023-09-27 RX ORDER — PROPOFOL 10 MG/ML
INJECTION, EMULSION INTRAVENOUS PRN
Status: DISCONTINUED | OUTPATIENT
Start: 2023-09-27 | End: 2023-09-27 | Stop reason: SDUPTHER

## 2023-09-27 RX ORDER — SODIUM CHLORIDE 9 MG/ML
INJECTION, SOLUTION INTRAVENOUS PRN
Status: DISCONTINUED | OUTPATIENT
Start: 2023-09-27 | End: 2023-09-27 | Stop reason: HOSPADM

## 2023-09-27 RX ORDER — ONDANSETRON 2 MG/ML
INJECTION INTRAMUSCULAR; INTRAVENOUS PRN
Status: DISCONTINUED | OUTPATIENT
Start: 2023-09-27 | End: 2023-09-27 | Stop reason: SDUPTHER

## 2023-09-27 RX ORDER — FENTANYL CITRATE 50 UG/ML
INJECTION, SOLUTION INTRAMUSCULAR; INTRAVENOUS PRN
Status: DISCONTINUED | OUTPATIENT
Start: 2023-09-27 | End: 2023-09-27 | Stop reason: SDUPTHER

## 2023-09-27 RX ORDER — SODIUM CHLORIDE 0.9 % (FLUSH) 0.9 %
5-40 SYRINGE (ML) INJECTION PRN
Status: DISCONTINUED | OUTPATIENT
Start: 2023-09-27 | End: 2023-09-27 | Stop reason: HOSPADM

## 2023-09-27 RX ORDER — SCOLOPAMINE TRANSDERMAL SYSTEM 1 MG/1
PATCH, EXTENDED RELEASE TRANSDERMAL PRN
Status: DISCONTINUED | OUTPATIENT
Start: 2023-09-27 | End: 2023-09-27 | Stop reason: SDUPTHER

## 2023-09-27 RX ORDER — ONDANSETRON 2 MG/ML
4 INJECTION INTRAMUSCULAR; INTRAVENOUS EVERY 6 HOURS PRN
Status: DISCONTINUED | OUTPATIENT
Start: 2023-09-27 | End: 2023-09-27 | Stop reason: HOSPADM

## 2023-09-27 RX ORDER — HYDROCODONE BITARTRATE AND ACETAMINOPHEN 5; 325 MG/1; MG/1
1 TABLET ORAL EVERY 4 HOURS PRN
Status: DISCONTINUED | OUTPATIENT
Start: 2023-09-27 | End: 2023-09-27 | Stop reason: HOSPADM

## 2023-09-27 RX ORDER — SULFAMETHOXAZOLE AND TRIMETHOPRIM 800; 160 MG/1; MG/1
1 TABLET ORAL 2 TIMES DAILY
Qty: 20 TABLET | Refills: 0 | Status: SHIPPED | OUTPATIENT
Start: 2023-09-27 | End: 2023-10-07

## 2023-09-27 RX ORDER — ENEMA 19; 7 G/133ML; G/133ML
1 ENEMA RECTAL
Status: COMPLETED | OUTPATIENT
Start: 2023-09-27 | End: 2023-09-27

## 2023-09-27 RX ORDER — DEXAMETHASONE SODIUM PHOSPHATE 4 MG/ML
INJECTION, SOLUTION INTRA-ARTICULAR; INTRALESIONAL; INTRAMUSCULAR; INTRAVENOUS; SOFT TISSUE PRN
Status: DISCONTINUED | OUTPATIENT
Start: 2023-09-27 | End: 2023-09-27 | Stop reason: SDUPTHER

## 2023-09-27 RX ADMIN — DEXAMETHASONE SODIUM PHOSPHATE 10 MG: 4 INJECTION, SOLUTION INTRAMUSCULAR; INTRAVENOUS at 09:56

## 2023-09-27 RX ADMIN — SODIUM PHOSPHATE 1 ENEMA: 7; 19 ENEMA RECTAL at 08:23

## 2023-09-27 RX ADMIN — SUGAMMADEX 200 MG: 100 INJECTION, SOLUTION INTRAVENOUS at 10:12

## 2023-09-27 RX ADMIN — FENTANYL CITRATE 50 MCG: 50 INJECTION, SOLUTION INTRAMUSCULAR; INTRAVENOUS at 10:19

## 2023-09-27 RX ADMIN — ONDANSETRON 4 MG: 2 INJECTION INTRAMUSCULAR; INTRAVENOUS at 10:12

## 2023-09-27 RX ADMIN — FENTANYL CITRATE 100 MCG: 50 INJECTION, SOLUTION INTRAMUSCULAR; INTRAVENOUS at 09:50

## 2023-09-27 RX ADMIN — PROPOFOL 150 MG: 10 INJECTION, EMULSION INTRAVENOUS at 09:50

## 2023-09-27 RX ADMIN — ROCURONIUM BROMIDE 40 MG: 10 INJECTION INTRAVENOUS at 09:50

## 2023-09-27 RX ADMIN — CEFOXITIN 2000 MG: 2 INJECTION, POWDER, FOR SOLUTION INTRAVENOUS at 09:56

## 2023-09-27 RX ADMIN — SCOPOLAMINE 1 PATCH: 1 PATCH, EXTENDED RELEASE TRANSDERMAL at 09:30

## 2023-09-27 RX ADMIN — SODIUM CHLORIDE: 9 INJECTION, SOLUTION INTRAVENOUS at 08:43

## 2023-09-27 RX ADMIN — FENTANYL CITRATE 50 MCG: 50 INJECTION, SOLUTION INTRAMUSCULAR; INTRAVENOUS at 10:01

## 2023-09-27 RX ADMIN — MIDAZOLAM 2 MG: 1 INJECTION INTRAMUSCULAR; INTRAVENOUS at 09:47

## 2023-09-27 ASSESSMENT — PAIN - FUNCTIONAL ASSESSMENT: PAIN_FUNCTIONAL_ASSESSMENT: 0-10

## 2023-09-27 ASSESSMENT — PAIN SCALES - GENERAL
PAINLEVEL_OUTOF10: 0

## 2023-09-27 NOTE — BRIEF OP NOTE
Brief Postoperative Note      Patient: Jojo Alvarez  YOB: 1970  MRN: [id number]    Date of Procedure: 9/27/2023    Pre-Op Diagnosis Codes:     * Perirectal abscess [K61.1]    Post-Op Diagnosis:  anal fistula       Procedure(s):  ANAL FISTULOTOMY    Surgeon(s):  Angela White DO    Assistant:  First Assistant: Bernice Bender RN    Anesthesia: General    Estimated Blood Loss (mL): 5    Complications: None      Electronically signed by Angela White DO on 9/27/2023 at 2:47 PM

## 2023-09-27 NOTE — PROGRESS NOTES
Pt returned to [facility] room 12. Vitals and assessment as charted. 0.9 infusing, @500ml to count from PACU. Pt has crackers and juice. Family at the bedside. Pt and family verbalized understanding of discharge criteria and call light use. Call light in reach.

## 2023-09-27 NOTE — PROGRESS NOTES
1017: Pt arrives to pacu, awakens to verbal stimuli. Pt on room air, respirations easy and unlabored. Denies pain. VSS  1030: Pt alert and talking, continues to deny pain  1047: Pt meets criteria for discharge from pacu, transported back to [facility] in stable condition.  VSS

## 2023-09-27 NOTE — DISCHARGE INSTRUCTIONS
office if you have any problem that concerns you, [phone]. After hours, you can reach the answering service via the office phone number. IF YOU NEED IMMEDIATE ATTENTION, GO TO THE EMERGENCY ROOM AND YOUR DOCTOR WILL BE CONTACTED.    [id number]  [place], Henry Noe  Electronically signed by Yudith De La O DO on 9/27/2023 at 10:28 AM

## 2023-09-27 NOTE — H&P
Nathalie Carranza. [place], [address]  [place] [facility]  [phone]  New Patient Evaluation in Office    Patient seen and examined independently by me. The below note has been modified by me to reflect current findings and plans. Abscess much improved. Still with some drainage. Plan for [address] with possible fistulotomy, advancement flap. Potential diagnostic and therapeutic modalities were discussed with the patient including surgical and nonsurgical options. The risks, complications and benefits of the options were discussed. Complications including, but not limited to, bleeding, wound infection, recurrence, and sphincter injury were reviewed. The patient was given an opportunity to ask questions. Once answered, the patient is agreeable to proceed with surgery. Planned outpatient under general anesthesia. Electronically signed by Juana Young DO on 9/8/2023 at 2:09 PM      Pt Name: Екатерина De La O  Date of Birth 1970   Today's Date: 9/8/2023  Medical Record Number: [id number]  Referring Provider: No ref. provider found  Primary Care Provider: GAVIOTA Levy - CNP  Chief Complaint   Patient presents with    Follow-up     Perirectal abscess-Last seen 9/01/23-Started Bactrim     ASSESSMENT       Diagnosis Orders   1. Perirectal abscess        2. Essential hypertension        3. Pre-op testing               PLANS      Rx Bactrim started 9/1  Anal Fistulotomy      CINDY Christensen is a 46 y.o. female in the office for evaluation of a rectal abscess. It began a few months ago and gradually worsened. Symptoms include discharge, redness, swelling, and pain. Abscess has associated symptoms of spontaneous drainage. She does not have previous history of cutaneous abscesses. She does not have diabetes, immunosuppression or history of MRSA.  Therapy thus far has been a course of Bactrim which has effective in reducing size and

## 2023-09-27 NOTE — PROGRESS NOTES
Pt has met discharge criteria and states she is ready for discharge to home. IV removed, gauze and tape applied. Dressed in own clothes and personal belongings gathered. Discharge instructions (with opioid medication education information) given to pt and family; pt and family verbalized understanding of discharge instructions, prescriptions and follow up appointments. Pt transported to discharge lobby by [facility] staff.

## 2023-09-27 NOTE — PROGRESS NOTES
Patient oriented to Same Day department and admitted to Same Day Surgery room 12. Patient verbalized approval for first name, last initial with physician name on unit whiteboard. Plan of care reviewed with patient. Patient room whiteboard filled out and discussed with patient and responsible adult. Patient and responsible adult offered Same Day Welcome Packet to review. Call light in reach. Bed in lowest position, locked, with one bed rail up. SCDs and warming blanket in place. Appropriate arm bands on patient. Bathroom offered. All questions and concerns of patient addressed. Meds to Beds:   Patient informed of [facility]'s Meds to [facility] program during admission.  Patient is agreeable to program.   Contact information for the pharmacy and the Meds to [facility] program:   Name: Elicia Willingham   Relationship to patient:spouse/significant other   Phone number: [phone]

## 2023-09-27 NOTE — ANESTHESIA PRE PROCEDURE
risks discussed with patient. Plan discussed with MARIUM. Megan Hedrick.  [address] [facility],    9/27/2023

## 2023-09-28 ENCOUNTER — TELEPHONE (OUTPATIENT)
Dept: SURGERY | Age: 53
End: 2023-09-28

## 2023-09-28 NOTE — TELEPHONE ENCOUNTER
Post procedural phone call placed to patient. Patient utilizing prescribed medication with adequate control of pain. Patient education against constipation, recommended increased fluid intake, progressive ambulation and stool softeners/stimulants as directed. Patient able to readback education. Patients follow up appointment verified and confirmed in chart. Time spent for patient questions. Patient to follow up with office as needed.

## 2023-09-29 NOTE — OP NOTE
[place] [address]                                OPERATIVE REPORT    PATIENT NAME: Jason Dutta                  :        1970  MED REC NO:   [id number]                           ROOM:  ACCOUNT NO:   [de-identified]                           ADMIT DATE: 2023  PROVIDER:     Joaquina Rick D.O.    DATE OF PROCEDURE:  2023    PREOPERATIVE DIAGNOSIS:  Perianal fistula. POSTOPERATIVE DIAGNOSIS:  Perianal fistula. OPERATION PERFORMED:  Anal fistulotomy. SURGEON:  Joaquina Rick DO    ANESTHESIA:  General with local.    ESTIMATED BLOOD LOSS:  5 mL. SPECIMENS:  None. COMPLICATIONS:  None immediately appreciated. DISCUSSION:  The patient is a 50-year-old female who presented to my  office, seen in evaluation in the presence of perianal abscess with  persistent drainage. After history and physical examination performed,  potential diagnostic and therapeutic modalities discussed with the  patient. Operative and nonoperative management discussed. Risks,  complications, benefits were reviewed. She was given opportunity to ask  questions. Once answered, informed consent was obtained. The patient  was brought to the operating room on 2023 for the procedure. FINDINGS:  At the time of exploration, fistulous track was located on  the right side and connected to the adjacent crypt as described below. OPERATIVE PROCEDURE:  The patient was brought to the operating room,  placed in supine position, placed under continuous cardiac, telemetry,  blood pressure and pulse oximetry monitoring. Placed her under general  anesthesia by the Anesthesia Department. The perirectal area was then  prepped and draped in sterile fashion. Digital rectal exam was  performed revealing adequate rectal tone and no masses palpable.   There  was a cord-like structure going from the external opening

## 2023-10-10 ENCOUNTER — OFFICE VISIT (OUTPATIENT)
Dept: SURGERY | Age: 53
End: 2023-10-10
Payer: COMMERCIAL

## 2023-10-10 VITALS
HEART RATE: 73 BPM | HEIGHT: 66 IN | RESPIRATION RATE: 16 BRPM | OXYGEN SATURATION: 97 % | SYSTOLIC BLOOD PRESSURE: 144 MMHG | DIASTOLIC BLOOD PRESSURE: 72 MMHG | TEMPERATURE: 97.4 F | WEIGHT: 189.4 LBS | BODY MASS INDEX: 30.44 KG/M2

## 2023-10-10 DIAGNOSIS — K61.1 PERIRECTAL ABSCESS: Primary | ICD-10-CM

## 2023-10-10 PROCEDURE — 99212 OFFICE O/P EST SF 10 MIN: CPT | Performed by: SURGERY

## 2023-11-13 RX ORDER — METOPROLOL TARTRATE 50 MG/1
TABLET, FILM COATED ORAL
Qty: 180 TABLET | Refills: 3 | Status: SHIPPED | OUTPATIENT
Start: 2023-11-13

## 2023-11-13 RX ORDER — FENOFIBRATE 145 MG/1
TABLET, COATED ORAL
Qty: 90 TABLET | Refills: 3 | Status: SHIPPED | OUTPATIENT
Start: 2023-11-13

## 2024-01-15 ENCOUNTER — TRANSCRIBE ORDERS (OUTPATIENT)
Dept: ADMINISTRATIVE | Age: 54
End: 2024-01-15

## 2024-01-15 DIAGNOSIS — M54.50 LOW BACK PAIN, UNSPECIFIED BACK PAIN LATERALITY, UNSPECIFIED CHRONICITY, UNSPECIFIED WHETHER SCIATICA PRESENT: Primary | ICD-10-CM

## 2024-01-15 DIAGNOSIS — M54.16 LUMBAR RADICULOPATHY: ICD-10-CM

## 2024-01-31 ENCOUNTER — HOSPITAL ENCOUNTER (OUTPATIENT)
Dept: MRI IMAGING | Age: 54
Discharge: HOME | End: 2024-01-31
Payer: COMMERCIAL

## 2024-01-31 DIAGNOSIS — M54.50 LOW BACK PAIN, UNSPECIFIED BACK PAIN LATERALITY, UNSPECIFIED CHRONICITY, UNSPECIFIED WHETHER SCIATICA PRESENT: ICD-10-CM

## 2024-01-31 DIAGNOSIS — M54.16 LUMBAR RADICULOPATHY: ICD-10-CM

## 2024-01-31 PROCEDURE — 72148 MRI LUMBAR SPINE W/O DYE: CPT

## 2024-09-17 RX ORDER — VALSARTAN 160 MG/1
160 TABLET ORAL DAILY
Qty: 30 TABLET | Refills: 0 | OUTPATIENT
Start: 2024-09-17

## 2024-09-18 ENCOUNTER — OFFICE VISIT (OUTPATIENT)
Dept: CARDIOLOGY CLINIC | Age: 54
End: 2024-09-18
Payer: COMMERCIAL

## 2024-09-18 VITALS
HEART RATE: 84 BPM | DIASTOLIC BLOOD PRESSURE: 90 MMHG | WEIGHT: 197.2 LBS | SYSTOLIC BLOOD PRESSURE: 142 MMHG | BODY MASS INDEX: 30.95 KG/M2 | HEIGHT: 67 IN

## 2024-09-18 DIAGNOSIS — I10 PRIMARY HYPERTENSION: Primary | ICD-10-CM

## 2024-09-18 PROCEDURE — 3077F SYST BP >= 140 MM HG: CPT | Performed by: PHYSICIAN ASSISTANT

## 2024-09-18 PROCEDURE — 3080F DIAST BP >= 90 MM HG: CPT | Performed by: PHYSICIAN ASSISTANT

## 2024-09-18 PROCEDURE — 93000 ELECTROCARDIOGRAM COMPLETE: CPT | Performed by: PHYSICIAN ASSISTANT

## 2024-09-18 PROCEDURE — 99214 OFFICE O/P EST MOD 30 MIN: CPT | Performed by: PHYSICIAN ASSISTANT

## 2024-09-18 RX ORDER — VALSARTAN 160 MG/1
160 TABLET ORAL DAILY
Qty: 90 TABLET | Refills: 3 | Status: CANCELLED | OUTPATIENT
Start: 2024-09-18

## 2024-09-18 RX ORDER — METOPROLOL TARTRATE 50 MG
TABLET ORAL
Qty: 180 TABLET | Refills: 3 | Status: SHIPPED | OUTPATIENT
Start: 2024-09-18

## 2024-09-18 RX ORDER — FENOFIBRATE 145 MG/1
TABLET, COATED ORAL
Qty: 90 TABLET | Refills: 3 | Status: SHIPPED | OUTPATIENT
Start: 2024-09-18

## 2024-09-18 RX ORDER — VALSARTAN 320 MG/1
320 TABLET ORAL DAILY
Qty: 90 TABLET | Refills: 1 | Status: SHIPPED | OUTPATIENT
Start: 2024-09-18

## 2024-09-18 RX ORDER — VALSARTAN 160 MG/1
160 TABLET ORAL DAILY
Qty: 90 TABLET | Refills: 1 | OUTPATIENT
Start: 2024-09-18

## 2024-10-16 DIAGNOSIS — I10 PRIMARY HYPERTENSION: ICD-10-CM

## 2024-10-16 RX ORDER — FENOFIBRATE 145 MG/1
TABLET, COATED ORAL
Qty: 90 TABLET | Refills: 3 | Status: SHIPPED | OUTPATIENT
Start: 2024-10-16

## 2024-10-16 RX ORDER — METOPROLOL TARTRATE 50 MG
TABLET ORAL
Qty: 180 TABLET | Refills: 3 | Status: SHIPPED | OUTPATIENT
Start: 2024-10-16

## 2025-03-10 DIAGNOSIS — I10 PRIMARY HYPERTENSION: ICD-10-CM

## 2025-03-10 RX ORDER — VALSARTAN 320 MG/1
320 TABLET ORAL DAILY
Qty: 30 TABLET | Refills: 5 | Status: SHIPPED | OUTPATIENT
Start: 2025-03-10

## 2025-08-05 ENCOUNTER — OFFICE VISIT (OUTPATIENT)
Dept: SURGERY | Age: 55
End: 2025-08-05
Payer: COMMERCIAL

## 2025-08-05 VITALS
SYSTOLIC BLOOD PRESSURE: 122 MMHG | TEMPERATURE: 97.7 F | DIASTOLIC BLOOD PRESSURE: 68 MMHG | HEART RATE: 80 BPM | HEIGHT: 66 IN | BODY MASS INDEX: 31.58 KG/M2 | OXYGEN SATURATION: 97 % | WEIGHT: 196.5 LBS

## 2025-08-05 DIAGNOSIS — K21.9 GASTROESOPHAGEAL REFLUX DISEASE, UNSPECIFIED WHETHER ESOPHAGITIS PRESENT: Primary | ICD-10-CM

## 2025-08-05 PROCEDURE — 99213 OFFICE O/P EST LOW 20 MIN: CPT | Performed by: SURGERY

## 2025-08-05 RX ORDER — PANTOPRAZOLE SODIUM 40 MG/1
40 TABLET, DELAYED RELEASE ORAL
Qty: 90 TABLET | Refills: 1 | Status: SHIPPED | OUTPATIENT
Start: 2025-08-05

## 2025-08-26 ENCOUNTER — OFFICE VISIT (OUTPATIENT)
Dept: SURGERY | Age: 55
End: 2025-08-26
Payer: COMMERCIAL

## 2025-08-26 VITALS
HEIGHT: 66 IN | TEMPERATURE: 97.3 F | SYSTOLIC BLOOD PRESSURE: 125 MMHG | OXYGEN SATURATION: 98 % | BODY MASS INDEX: 31.24 KG/M2 | HEART RATE: 89 BPM | DIASTOLIC BLOOD PRESSURE: 72 MMHG

## 2025-08-26 DIAGNOSIS — K21.9 GASTROESOPHAGEAL REFLUX DISEASE, UNSPECIFIED WHETHER ESOPHAGITIS PRESENT: Primary | ICD-10-CM

## 2025-08-26 PROCEDURE — 99213 OFFICE O/P EST LOW 20 MIN: CPT | Performed by: SURGERY

## (undated) DEVICE — PACK,LAPAROSCOPY,PK II,SIRUS: Brand: MEDLINE

## (undated) DEVICE — PAD,SANITARY,11 IN,MAXI,W/WINGS,N-STRL: Brand: MEDLINE

## (undated) DEVICE — SET UP: Brand: MEDLINE INDUSTRIES, INC.

## (undated) DEVICE — GLOVE ORANGE PI 7 1/2   MSG9075

## (undated) DEVICE — HYPODERMIC SAFETY NEEDLE: Brand: MAGELLAN

## (undated) DEVICE — SOLUTION SCRB 4OZ 7.5% POVIDONE IOD ANTIMIC BTL

## (undated) DEVICE — HYDROGEN PEROXIDE 3% 8OZ

## (undated) DEVICE — PREMIUM DRY TRAY LF: Brand: MEDLINE INDUSTRIES, INC.

## (undated) DEVICE — PENCIL SMK EVAC ALL IN 1 DSGN ENH VISIBILITY IMPROVED AIR

## (undated) DEVICE — SOLUTION PREP PAINT POV IOD FOR SKIN MUCOUS MEM